# Patient Record
Sex: FEMALE | Race: WHITE | Employment: OTHER | URBAN - METROPOLITAN AREA
[De-identification: names, ages, dates, MRNs, and addresses within clinical notes are randomized per-mention and may not be internally consistent; named-entity substitution may affect disease eponyms.]

---

## 2017-09-14 ENCOUNTER — HOSPITAL ENCOUNTER (OUTPATIENT)
Dept: MAMMOGRAPHY | Age: 47
Discharge: HOME | End: 2017-09-14
Attending: OBSTETRICS & GYNECOLOGY
Payer: COMMERCIAL

## 2017-09-14 DIAGNOSIS — Z12.31 VISIT FOR SCREENING MAMMOGRAM: ICD-10-CM

## 2017-09-14 PROCEDURE — 77067 SCR MAMMO BI INCL CAD: CPT

## 2018-08-08 ENCOUNTER — HOSPITAL ENCOUNTER (OUTPATIENT)
Dept: ULTRASOUND IMAGING | Age: 48
Discharge: HOME | End: 2018-08-08
Attending: OTOLARYNGOLOGY
Payer: COMMERCIAL

## 2018-08-08 VITALS
SYSTOLIC BLOOD PRESSURE: 106 MMHG | RESPIRATION RATE: 18 BRPM | OXYGEN SATURATION: 99 % | DIASTOLIC BLOOD PRESSURE: 74 MMHG | HEART RATE: 77 BPM | TEMPERATURE: 98 F

## 2018-08-08 DIAGNOSIS — E04.1 THYROID NODULE: ICD-10-CM

## 2018-08-08 PROCEDURE — 88173 CYTOPATH EVAL FNA REPORT: CPT

## 2018-08-08 PROCEDURE — 74011250636 HC RX REV CODE- 250/636: Performed by: RADIOLOGY

## 2018-08-08 PROCEDURE — 88305 TISSUE EXAM BY PATHOLOGIST: CPT

## 2018-08-08 PROCEDURE — 10022 US GUIDE FINE NDL ASP THYROID: CPT

## 2018-08-08 PROCEDURE — 74011000250 HC RX REV CODE- 250: Performed by: RADIOLOGY

## 2018-08-08 RX ORDER — LIDOCAINE HYDROCHLORIDE 20 MG/ML
20-200 INJECTION, SOLUTION INFILTRATION; PERINEURAL ONCE
Status: COMPLETED | OUTPATIENT
Start: 2018-08-08 | End: 2018-08-08

## 2018-08-08 RX ADMIN — LIDOCAINE HYDROCHLORIDE 120 MG: 20 INJECTION, SOLUTION INFILTRATION; PERINEURAL at 14:48

## 2018-08-08 RX ADMIN — SODIUM BICARBONATE 2 ML: 0.2 INJECTION, SOLUTION INTRAVENOUS at 14:48

## 2018-08-08 NOTE — IP AVS SNAPSHOT
[facility] 
 
 
 [address] 
[phone] Patient: [facility] MRN: [id number] OAO:[id number] About your hospitalization You were admitted on:  August 8, 2018 You last received care in the:  D RADIOLOGY ULTRASOUND You were discharged on:  August 8, 2018 Why you were hospitalized Your primary diagnosis was:  Not on File Follow-up Information None Your Scheduled Appointments Tuesday August 14, 2018  1:45 PM EDT Office Visit with Jad Friedman, [address]  ENT 8001 [facility]) [address]  
[phone] Friday September 21, 2018  2:15 PM EDT  
KIT MAMMO SCREENING with E KIT BI ROOM 2 [facility] ([facility]) [address] [place] [address]  
[phone] ***** NOTE: Appointments for the Mobile Mammography UNIT CANNOT be made on My Chart *****  PATIENT ARRIVAL - Please report 30 minutes early to check in. GENERAL INSTRUCTIONS -  On the day of your exam do not use any bath powder, deodorant or lotions on the chest or armpit area. Wear two-piece outfit for ease of changing. Allow at least 1 hour for test. -  If scheduled at [facility], please register on the 1st floor before going upstairs. [address] [facility]. 2nd floor [facility] Discharge Orders None A check lou indicates which time of day the medication should be taken. My Medications ASK your doctor about these medications Instructions Each Dose to Equal  
 Morning Noon Evening Bedtime FISH OIL 1,000 mg Cap Generic drug:  omega-3 fatty acids-vitamin e Your last dose was: Your next dose is: Take  by mouth daily. FLEXERIL 10 mg tablet Generic drug:  cyclobenzaprine Your last dose was: Your next dose is: Take  by mouth three (3) times daily as needed for Muscle Spasm(s). * fluticasone 50 mcg/actuation nasal spray Commonly known as:  Rey Gut Your last dose was: Your next dose is:    
   
   
 2 sprays per nostril daily * fluticasone 50 mcg/actuation nasal spray Commonly known as:  Rey Gut Your last dose was: Your next dose is:    
   
   
 2 sprays each nostril daily GLUCOSAMINE COMPLEX PO Your last dose was: Your next dose is: Take  by mouth daily. VITAMIN C 1,000 mg tablet Generic drug:  ascorbic acid (vitamin C) Your last dose was: Your next dose is: Take  by mouth daily. WOMEN'S DAILY MULTIVITAMIN PO Your last dose was: Your next dose is: Take  by mouth daily. ZENCHENT FE 0.4mg-35mcg(21) and 75 mg (7) Chew Generic drug:  Noreth-Ethinyl Estradiol-Iron Your last dose was: Your next dose is: Take 1 Tab by mouth daily. Continuous use, active pills only. Needs 4 packs for a 3 month supply 1 Tab * Notice: This list has 2 medication(s) that are the same as other medications prescribed for you. Read the directions carefully, and ask your doctor or other care provider to review them with you. Discharge Instructions [address] Department of Interventional Radiology [address] [facility] 
[phone] Office 
[phone] Fax BIOPSY DISCHARGE INSTRUCTIONS General Instructions:    A biopsy is the removal of a small piece of tissue for microscopic examination or testing. Healthy tissue can be obtained for the purpose of tissue-type matching for transplants. Unhealthy tissues are more commonly biopsied to diagnose disease. General Biopsy: 
   A mass can grow in any area of the body, and we are taking a specimen as ordered by your doctor. The risks are the same. They include bleeding, pain, and infection. Home Care Instructions: You may resume your regular diet and medication regimen. Do not drink alcohol, drive, or make any important legal decisions in the next 24 hours. Do not lift anything heavier than a gallon of milk until the soreness goes away. You may use over the counter acetaminophen or ibuprofen for the soreness. You may apply an ice pack to the affected area for 20-30 minutes at time for the first 24 hours. After that, you may apply a heat pack. Call If: You should call your Physician and/or the Radiology Nurse if you have any questions or concerns about the biopsy site. Call if you should have increased pain, fever, redness, drainage, or bleeding more than a small spot on the bandage. Follow-Up Instructions: Please see your ordering doctor as he/she has requested. To Reach Us:   
 
 
Patient Signature: 
Date: 8/8/2018 Discharging Nurse: Sandra Bowen RN Introducing [facility] SERVICES! Dear Marilynn Cabrera: Thank you for requesting a [employer] account. Our records indicate that you already have an active [employer] account. You can access your account anytime at [email or web address] Did you know that you can access your hospital and ER discharge instructions at any time in [employer]? You can also review all of your test results from your hospital stay or ER visit. Additional Information If you have questions, please visit the Frequently Asked Questions section of the [employer] website at [email or web address] Remember, [employer] is NOT to be used for urgent needs.  For medical emergencies, dial 911. Now available from your iPhone and Android! Introducing Domenico Walton As a [facility] patient, I wanted to make you aware of our electronic visit tool called Domenico Walton. [employer] allows you to connect within minutes with a medical provider 24 hours a day, seven days a week via a mobile device or tablet or logging into a secure website from your computer. You can access Domenico Walton from anywhere in the [place]. A virtual visit might be right for you when you have a simple condition and feel like you just dont want to get out of bed, or cant get away from work for an appointment, when your regular [facility] provider is not available (evenings, weekends or holidays), or when youre out of town and need minor care. Electronic visits cost only $49 and if the [employer] provider determines a prescription is needed to treat your condition, one can be electronically transmitted to a nearby pharmacy*. Please take a moment to enroll today if you have not already done so. The enrollment process is free and takes just a few minutes. To enroll, please download the [employer] rand to your tablet or phone, or visit [email or web address] to enroll on your computer. And, as an [address] [place] [address] patient with a [employer] account, the results of your visits will be scanned into your electronic medical record and your primary care provider will be able to view the scanned results. We urge you to continue to see your regular [facility] provider for your ongoing medical care. And while your primary care provider may not be the one available when you seek a Domenico Walton virtual visit, the peace of mind you get from getting a real diagnosis real time can be priceless. For more information on Domenico Walton, view our Frequently Asked Questions (FAQs) at [email or web address].  
 
Sincerely, 
 
Odette Sales MD 
 Chief Medical Officer [employer] *:  certain medications cannot be prescribed via Domenico Walton Unresulted Labs-Please follow up with your PCP about these lab tests Order Current Status US GUIDE FINE NDL ASP THYROID In process Providers Seen During Your Hospitalization Provider Specialty Primary office phone Rolan Stover DO Otolaryngology [phone] Your Primary Care Physician (PCP) Primary Care Physician Office Phone Office Fax Oscar Del Rio [phone] You are allergic to the following Allergen Reactions Adhesive Other (comments) Skin irritation; burning with tape Recent Documentation OB Status Smoking Status Hysterectomy Never Smoker Emergency Contacts Name Discharge Info Relation Home Work Mobile DayanaPer DISCHARGE CAREGIVER [3] Spouse [3] [phone] Broderick Anne  Son [22] [phone] Patient Belongings The following personal items are in your possession at time of discharge: 
                             
 
  
  
 Please provide this summary of care documentation to your next provider. Signatures-by signing, you are acknowledging that this After Visit Summary has been reviewed with you and you have received a copy. Patient Signature:  ____________________________________________________________ Date:  ____________________________________________________________  
  
Alisia Correa Provider Signature:  ____________________________________________________________ Date:  ____________________________________________________________

## 2018-08-08 NOTE — PROGRESS NOTES
Recovery period without difficulty. Pt alert and oriented and denies pain. Dressing is clean, dry, and intact. Reviewed discharge instructions with patient verbalized understanding. Pt escorted to lobby discharge area  Vital signs  completed.

## 2018-08-08 NOTE — DISCHARGE INSTRUCTIONS
[address]  Department of Interventional Radiology  [facility]  [phone] Office  [phone] Fax    BIOPSY DISCHARGE INSTRUCTIONS    General Instructions:     A biopsy is the removal of a small piece of tissue for microscopic examination or testing. Healthy tissue can be obtained for the purpose of tissue-type matching for transplants. Unhealthy tissues are more commonly biopsied to diagnose disease. General Biopsy:     A mass can grow in any area of the body, and we are taking a specimen as ordered by your doctor. The risks are the same. They include bleeding, pain, and infection. Home Care Instructions: You may resume your regular diet and medication regimen. Do not drink alcohol, drive, or make any important legal decisions in the next 24 hours. Do not lift anything heavier than a gallon of milk until the soreness goes away. You may use over the counter acetaminophen or ibuprofen for the soreness. You may apply an ice pack to the affected area for 20-30 minutes at time for the first 24 hours. After that, you may apply a heat pack. Call If: You should call your Physician and/or the Radiology Nurse if you have any questions or concerns about the biopsy site. Call if you should have increased pain, fever, redness, drainage, or bleeding more than a small spot on the bandage. Follow-Up Instructions: Please see your ordering doctor as he/she has requested.     To Reach Us:        Patient Signature:  Date: 8/8/2018  Discharging Nurse: Sandra Bowen RN

## 2018-09-21 ENCOUNTER — HOSPITAL ENCOUNTER (OUTPATIENT)
Dept: MAMMOGRAPHY | Age: 48
Discharge: HOME | End: 2018-09-21
Attending: OBSTETRICS & GYNECOLOGY
Payer: COMMERCIAL

## 2018-09-21 DIAGNOSIS — Z12.31 ENCOUNTER FOR SCREENING MAMMOGRAM FOR MALIGNANT NEOPLASM OF BREAST: ICD-10-CM

## 2018-09-21 PROCEDURE — 77067 SCR MAMMO BI INCL CAD: CPT

## 2019-07-09 PROBLEM — R10.9 ABDOMINAL PAIN OF OTHER SPECIFIED SITE: Status: ACTIVE | Noted: 2019-07-09

## 2019-09-26 ENCOUNTER — HOSPITAL ENCOUNTER (OUTPATIENT)
Dept: MAMMOGRAPHY | Age: 49
Discharge: HOME | End: 2019-09-26
Attending: OBSTETRICS & GYNECOLOGY
Payer: COMMERCIAL

## 2019-09-26 DIAGNOSIS — Z12.31 ENCOUNTER FOR SCREENING MAMMOGRAM FOR MALIGNANT NEOPLASM OF BREAST: ICD-10-CM

## 2019-09-26 PROCEDURE — 77067 SCR MAMMO BI INCL CAD: CPT

## 2020-07-06 DIAGNOSIS — J34.89 NASAL OBSTRUCTION: ICD-10-CM

## 2020-07-06 DIAGNOSIS — J34.2 DEVIATED NASAL SEPTUM: Primary | ICD-10-CM

## 2020-07-06 DIAGNOSIS — J34.3 HYPERTROPHY OF NASAL TURBINATES: ICD-10-CM

## 2020-07-06 DIAGNOSIS — J32.9 CHRONIC SINUSITIS, UNSPECIFIED LOCATION: ICD-10-CM

## 2020-07-13 ENCOUNTER — HOSPITAL ENCOUNTER (OUTPATIENT)
Dept: SURGERY | Age: 50
Discharge: HOME | End: 2020-07-13

## 2020-07-15 VITALS — BODY MASS INDEX: 22.32 KG/M2 | WEIGHT: 126 LBS | HEIGHT: 63 IN

## 2020-07-20 ENCOUNTER — ANESTHESIA EVENT (OUTPATIENT)
Dept: SURGERY | Age: 50
End: 2020-07-20
Payer: COMMERCIAL

## 2020-07-20 ENCOUNTER — HOSPITAL ENCOUNTER (OUTPATIENT)
Age: 50
Setting detail: OUTPATIENT SURGERY
Discharge: HOME | End: 2020-07-20
Attending: OTOLARYNGOLOGY | Admitting: OTOLARYNGOLOGY
Payer: COMMERCIAL

## 2020-07-20 ENCOUNTER — ANESTHESIA (OUTPATIENT)
Dept: SURGERY | Age: 50
End: 2020-07-20
Payer: COMMERCIAL

## 2020-07-20 VITALS
RESPIRATION RATE: 15 BRPM | OXYGEN SATURATION: 94 % | HEART RATE: 58 BPM | TEMPERATURE: 98.1 F | DIASTOLIC BLOOD PRESSURE: 61 MMHG | WEIGHT: 125.7 LBS | SYSTOLIC BLOOD PRESSURE: 109 MMHG | BODY MASS INDEX: 22.27 KG/M2

## 2020-07-20 DIAGNOSIS — J32.9 CHRONIC SINUSITIS, UNSPECIFIED LOCATION: ICD-10-CM

## 2020-07-20 DIAGNOSIS — J34.89 NASAL OBSTRUCTION: ICD-10-CM

## 2020-07-20 DIAGNOSIS — J34.2 DEVIATED NASAL SEPTUM: ICD-10-CM

## 2020-07-20 DIAGNOSIS — J34.3 HYPERTROPHY OF NASAL TURBINATES: ICD-10-CM

## 2020-07-20 LAB — HGB BLD-MCNC: 13.9 G/DL (ref 11.7–15.4)

## 2020-07-20 PROCEDURE — 77030036727 HC DEV INFL BLN SNUS SE DISP ACCL -B: Performed by: OTOLARYNGOLOGY

## 2020-07-20 PROCEDURE — 76210000006 HC OR PH I REC 0.5 TO 1 HR: Performed by: OTOLARYNGOLOGY

## 2020-07-20 PROCEDURE — 74011250636 HC RX REV CODE- 250/636: Performed by: ANESTHESIOLOGY

## 2020-07-20 PROCEDURE — C1887 CATHETER, GUIDING: HCPCS | Performed by: OTOLARYNGOLOGY

## 2020-07-20 PROCEDURE — 74011000250 HC RX REV CODE- 250: Performed by: OTOLARYNGOLOGY

## 2020-07-20 PROCEDURE — 77030018390 HC SPNG HEMSTAT2 J&J -B: Performed by: OTOLARYNGOLOGY

## 2020-07-20 PROCEDURE — 77030018836 HC SOL IRR NACL ICUM -A: Performed by: OTOLARYNGOLOGY

## 2020-07-20 PROCEDURE — 77030037088 HC TUBE ENDOTRACH ORAL NSL COVD-A: Performed by: ANESTHESIOLOGY

## 2020-07-20 PROCEDURE — 74011250636 HC RX REV CODE- 250/636: Performed by: NURSE ANESTHETIST, CERTIFIED REGISTERED

## 2020-07-20 PROCEDURE — 76060000033 HC ANESTHESIA 1 TO 1.5 HR: Performed by: OTOLARYNGOLOGY

## 2020-07-20 PROCEDURE — 77030002888 HC SUT CHRMC J&J -A: Performed by: OTOLARYNGOLOGY

## 2020-07-20 PROCEDURE — 85018 HEMOGLOBIN: CPT

## 2020-07-20 PROCEDURE — 74011250637 HC RX REV CODE- 250/637: Performed by: OTOLARYNGOLOGY

## 2020-07-20 PROCEDURE — 74011000250 HC RX REV CODE- 250: Performed by: NURSE ANESTHETIST, CERTIFIED REGISTERED

## 2020-07-20 PROCEDURE — 76010000149 HC OR TIME 1 TO 1.5 HR: Performed by: OTOLARYNGOLOGY

## 2020-07-20 PROCEDURE — 77030008357 HC SPLNT NSL INT THOM -B: Performed by: OTOLARYNGOLOGY

## 2020-07-20 PROCEDURE — 77030006907 HC BLD SNUS SHV MEDT -C: Performed by: OTOLARYNGOLOGY

## 2020-07-20 PROCEDURE — 77030039425 HC BLD LARYNG TRULITE DISP TELE -A: Performed by: ANESTHESIOLOGY

## 2020-07-20 PROCEDURE — 76210000020 HC REC RM PH II FIRST 0.5 HR: Performed by: OTOLARYNGOLOGY

## 2020-07-20 PROCEDURE — 77030002916 HC SUT ETHLN J&J -A: Performed by: OTOLARYNGOLOGY

## 2020-07-20 RX ORDER — LIDOCAINE HYDROCHLORIDE 10 MG/ML
0.1 INJECTION INFILTRATION; PERINEURAL AS NEEDED
Status: DISCONTINUED | OUTPATIENT
Start: 2020-07-20 | End: 2020-07-20 | Stop reason: HOSPADM

## 2020-07-20 RX ORDER — DEXAMETHASONE SODIUM PHOSPHATE 4 MG/ML
INJECTION, SOLUTION INTRA-ARTICULAR; INTRALESIONAL; INTRAMUSCULAR; INTRAVENOUS; SOFT TISSUE AS NEEDED
Status: DISCONTINUED | OUTPATIENT
Start: 2020-07-20 | End: 2020-07-20 | Stop reason: HOSPADM

## 2020-07-20 RX ORDER — MIDAZOLAM HYDROCHLORIDE 1 MG/ML
2 INJECTION, SOLUTION INTRAMUSCULAR; INTRAVENOUS
Status: COMPLETED | OUTPATIENT
Start: 2020-07-20 | End: 2020-07-20

## 2020-07-20 RX ORDER — GLYCOPYRROLATE 0.2 MG/ML
INJECTION INTRAMUSCULAR; INTRAVENOUS AS NEEDED
Status: DISCONTINUED | OUTPATIENT
Start: 2020-07-20 | End: 2020-07-20 | Stop reason: HOSPADM

## 2020-07-20 RX ORDER — FENTANYL CITRATE 50 UG/ML
INJECTION, SOLUTION INTRAMUSCULAR; INTRAVENOUS AS NEEDED
Status: DISCONTINUED | OUTPATIENT
Start: 2020-07-20 | End: 2020-07-20 | Stop reason: HOSPADM

## 2020-07-20 RX ORDER — DIPHENHYDRAMINE HYDROCHLORIDE 50 MG/ML
12.5 INJECTION, SOLUTION INTRAMUSCULAR; INTRAVENOUS
Status: DISCONTINUED | OUTPATIENT
Start: 2020-07-20 | End: 2020-07-21 | Stop reason: HOSPADM

## 2020-07-20 RX ORDER — LIDOCAINE HYDROCHLORIDE 20 MG/ML
INJECTION, SOLUTION EPIDURAL; INFILTRATION; INTRACAUDAL; PERINEURAL AS NEEDED
Status: DISCONTINUED | OUTPATIENT
Start: 2020-07-20 | End: 2020-07-20 | Stop reason: HOSPADM

## 2020-07-20 RX ORDER — HYDROMORPHONE HYDROCHLORIDE 2 MG/ML
0.5 INJECTION, SOLUTION INTRAMUSCULAR; INTRAVENOUS; SUBCUTANEOUS
Status: COMPLETED | OUTPATIENT
Start: 2020-07-20 | End: 2020-07-20

## 2020-07-20 RX ORDER — ROCURONIUM BROMIDE 10 MG/ML
INJECTION, SOLUTION INTRAVENOUS AS NEEDED
Status: DISCONTINUED | OUTPATIENT
Start: 2020-07-20 | End: 2020-07-20 | Stop reason: HOSPADM

## 2020-07-20 RX ORDER — SODIUM CHLORIDE, SODIUM LACTATE, POTASSIUM CHLORIDE, CALCIUM CHLORIDE 600; 310; 30; 20 MG/100ML; MG/100ML; MG/100ML; MG/100ML
75 INJECTION, SOLUTION INTRAVENOUS CONTINUOUS
Status: DISCONTINUED | OUTPATIENT
Start: 2020-07-20 | End: 2020-07-21 | Stop reason: HOSPADM

## 2020-07-20 RX ORDER — SODIUM CHLORIDE, SODIUM LACTATE, POTASSIUM CHLORIDE, CALCIUM CHLORIDE 600; 310; 30; 20 MG/100ML; MG/100ML; MG/100ML; MG/100ML
100 INJECTION, SOLUTION INTRAVENOUS CONTINUOUS
Status: DISCONTINUED | OUTPATIENT
Start: 2020-07-20 | End: 2020-07-20 | Stop reason: HOSPADM

## 2020-07-20 RX ORDER — FLUMAZENIL 0.1 MG/ML
0.2 INJECTION INTRAVENOUS
Status: DISCONTINUED | OUTPATIENT
Start: 2020-07-20 | End: 2020-07-21 | Stop reason: HOSPADM

## 2020-07-20 RX ORDER — NEOSTIGMINE METHYLSULFATE 1 MG/ML
INJECTION, SOLUTION INTRAVENOUS AS NEEDED
Status: DISCONTINUED | OUTPATIENT
Start: 2020-07-20 | End: 2020-07-20 | Stop reason: HOSPADM

## 2020-07-20 RX ORDER — NALOXONE HYDROCHLORIDE 0.4 MG/ML
0.1 INJECTION, SOLUTION INTRAMUSCULAR; INTRAVENOUS; SUBCUTANEOUS AS NEEDED
Status: DISCONTINUED | OUTPATIENT
Start: 2020-07-20 | End: 2020-07-21 | Stop reason: HOSPADM

## 2020-07-20 RX ORDER — EPHEDRINE SULFATE/0.9% NACL/PF 50 MG/5 ML
SYRINGE (ML) INTRAVENOUS AS NEEDED
Status: DISCONTINUED | OUTPATIENT
Start: 2020-07-20 | End: 2020-07-20 | Stop reason: HOSPADM

## 2020-07-20 RX ORDER — MUPIROCIN 20 MG/G
OINTMENT TOPICAL AS NEEDED
Status: DISCONTINUED | OUTPATIENT
Start: 2020-07-20 | End: 2020-07-20 | Stop reason: HOSPADM

## 2020-07-20 RX ORDER — OXYCODONE HYDROCHLORIDE 5 MG/1
5 TABLET ORAL
Status: DISCONTINUED | OUTPATIENT
Start: 2020-07-20 | End: 2020-07-21 | Stop reason: HOSPADM

## 2020-07-20 RX ORDER — ONDANSETRON 2 MG/ML
INJECTION INTRAMUSCULAR; INTRAVENOUS AS NEEDED
Status: DISCONTINUED | OUTPATIENT
Start: 2020-07-20 | End: 2020-07-20 | Stop reason: HOSPADM

## 2020-07-20 RX ORDER — OXYMETAZOLINE HCL 0.05 %
SPRAY, NON-AEROSOL (ML) NASAL AS NEEDED
Status: DISCONTINUED | OUTPATIENT
Start: 2020-07-20 | End: 2020-07-20 | Stop reason: HOSPADM

## 2020-07-20 RX ORDER — LIDOCAINE HYDROCHLORIDE AND EPINEPHRINE 10; 10 MG/ML; UG/ML
INJECTION, SOLUTION INFILTRATION; PERINEURAL AS NEEDED
Status: DISCONTINUED | OUTPATIENT
Start: 2020-07-20 | End: 2020-07-20 | Stop reason: HOSPADM

## 2020-07-20 RX ADMIN — HYDROMORPHONE HYDROCHLORIDE 0.5 MG: 2 INJECTION INTRAMUSCULAR; INTRAVENOUS; SUBCUTANEOUS at 13:26

## 2020-07-20 RX ADMIN — HYDROMORPHONE HYDROCHLORIDE 0.5 MG: 2 INJECTION INTRAMUSCULAR; INTRAVENOUS; SUBCUTANEOUS at 13:14

## 2020-07-20 RX ADMIN — Medication 10 MG: at 12:07

## 2020-07-20 RX ADMIN — MIDAZOLAM 2 MG: 1 INJECTION INTRAMUSCULAR; INTRAVENOUS at 11:45

## 2020-07-20 RX ADMIN — HYDROMORPHONE HYDROCHLORIDE 0.5 MG: 2 INJECTION INTRAMUSCULAR; INTRAVENOUS; SUBCUTANEOUS at 13:19

## 2020-07-20 RX ADMIN — Medication 3 MG: at 12:53

## 2020-07-20 RX ADMIN — GLYCOPYRROLATE 0.4 MG: 0.2 INJECTION, SOLUTION INTRAMUSCULAR; INTRAVENOUS at 12:53

## 2020-07-20 RX ADMIN — ROCURONIUM BROMIDE 30 MG: 10 INJECTION, SOLUTION INTRAVENOUS at 11:54

## 2020-07-20 RX ADMIN — LIDOCAINE HYDROCHLORIDE 60 MG: 20 INJECTION, SOLUTION EPIDURAL; INFILTRATION; INTRACAUDAL; PERINEURAL at 11:54

## 2020-07-20 RX ADMIN — FENTANYL CITRATE 100 MCG: 50 INJECTION INTRAMUSCULAR; INTRAVENOUS at 11:54

## 2020-07-20 RX ADMIN — ONDANSETRON 4 MG: 2 INJECTION INTRAMUSCULAR; INTRAVENOUS at 12:12

## 2020-07-20 RX ADMIN — PHENYLEPHRINE HYDROCHLORIDE 50 MCG: 10 INJECTION INTRAVENOUS at 12:34

## 2020-07-20 RX ADMIN — SODIUM CHLORIDE, SODIUM LACTATE, POTASSIUM CHLORIDE, AND CALCIUM CHLORIDE 100 ML/HR: 600; 310; 30; 20 INJECTION, SOLUTION INTRAVENOUS at 11:45

## 2020-07-20 RX ADMIN — PHENYLEPHRINE HYDROCHLORIDE 50 MCG: 10 INJECTION INTRAVENOUS at 12:05

## 2020-07-20 RX ADMIN — HYDROMORPHONE HYDROCHLORIDE 0.5 MG: 2 INJECTION INTRAMUSCULAR; INTRAVENOUS; SUBCUTANEOUS at 13:31

## 2020-07-20 RX ADMIN — PHENYLEPHRINE HYDROCHLORIDE 50 MCG: 10 INJECTION INTRAVENOUS at 12:19

## 2020-07-20 RX ADMIN — DEXAMETHASONE SODIUM PHOSPHATE 5 MG: 4 INJECTION, SOLUTION INTRAMUSCULAR; INTRAVENOUS at 12:12

## 2020-07-20 NOTE — ANESTHESIA POSTPROCEDURE EVALUATION
Procedure(s):  SEPTOPLASTY  TURBINATE REDUCTION  EXCISION OF RIGHT JOYCE BULLOSA/ BILATERAL MAXILARY BALLOON SINUPLASTY WITH POSS BIOPSIES.     general    Anesthesia Post Evaluation        Patient location during evaluation: PACU  Patient participation: complete - patient participated  Level of consciousness: responsive to verbal stimuli  Pain management: adequate  Airway patency: patent  Anesthetic complications: no  Cardiovascular status: acceptable  Respiratory status: acceptable  Hydration status: euvolemic        INITIAL Post-op Vital signs:   Vitals Value Taken Time   /62 7/20/2020  1:57 PM   Temp 36.7 °C (98.1 °F) 7/20/2020  1:07 PM   Pulse 56 7/20/2020  1:57 PM   Resp 16 7/20/2020  1:57 PM   SpO2 93 % 7/20/2020  1:57 PM

## 2020-07-20 NOTE — DISCHARGE INSTRUCTIONS
After Septoplasty  AMBULATORY CARE:  Seek care immediately if:     You have trouble breathing. Clear fluid comes out of your nose when you bend your head forward. Your heart is beating fast or has an irregular rhythm. Your nose or the roof of your mouth is pale or starting to turn black. You have severe pain. You have red streaks on the skin around your nose. Contact your healthcare provider if:     Your symptoms do not improve within 1 week. Your nose bleeds more than you were told to expect. You have a fever. Your nose is red, swollen, and draining pus. Your upper teeth, gum, or nose is numb. Your sense of smell or taste is different than before surgery. You have a change in your vision. You have questions or concerns about your condition or care. Medicines:   Medicines may be given to help decrease pain and prevent infection. You may also need nose sprays to keep your nose moist and decrease swelling and congestion. Take your medicine as directed. Contact your healthcare provider if you think your medicine is not helping or if you have side effects. Tell him or her if you are allergic to any medicine. Keep a list of the medicines, vitamins, and herbs you take. Include the amounts, and when and why you take them. Bring the list or the pill bottles to follow-up visits. Carry your medicine list with you in case of an emergency. Do not blow your nose: The increase in pressure can cause bruising, swelling, and bleeding. Try not to sneeze. If you have to sneeze, keep your mouth open to decrease pressure in your nose. Apply ice: Apply ice on your nose for 15 to 20 minutes every hour for the first day. Use an ice pack, or put crushed ice in a plastic bag. Cover it with a towel. Ice helps prevent tissue damage and decreases swelling and pain. Elevate your head and upper back: Keep your head and upper back elevated when you rest, such as in a recliner.  Place extra pillows under your head and neck when you sleep in bed. Elevation will help decrease swelling. Self-care:   Use a cool mist humidifier. A cool mist humidifier will increase air moisture in your home. This will help keep your nose and throat moist and prevent irritation. Limit activity for 3 days or as directed. Do not lift objects over 20 pounds. Ask when you can return to your usual daily activities. Care for your wound as directed. You may be able to use saltwater or hydrogen peroxide to remove crusts. If you have a splint, do not get it wet or try to remove it. Do not smoke for at least 2 days after your surgery. Smoke can irritate your nose and delay healing. If you smoke, it is never too late to quit. Ask your healthcare provider for information if you need help quitting. Follow up with your healthcare provider as directed: You may need to return to have your gauze or splint removed. Write down your questions so you remember to ask them during your visits.

## 2020-07-20 NOTE — ANESTHESIA PREPROCEDURE EVALUATION
Relevant Problems   No relevant active problems       Anesthetic History   No history of anesthetic complications            Review of Systems / Medical History  Patient summary reviewed and pertinent labs reviewed    Pulmonary                Comments: Chronic sinus drainage and hoarseness   Neuro/Psych   Within defined limits           Cardiovascular  Within defined limits                Exercise tolerance: >4 METS     GI/Hepatic/Renal  Within defined limits              Endo/Other        Arthritis     Other Findings   Comments: PCOS           Physical Exam    Airway  Mallampati: II  TM Distance: 4 - 6 cm  Neck ROM: normal range of motion   Mouth opening: Normal     Cardiovascular  Regular rate and rhythm,  S1 and S2 normal,  no murmur, click, rub, or gallop             Dental  No notable dental hx       Pulmonary  Breath sounds clear to auscultation               Abdominal  GI exam deferred       Other Findings            Anesthetic Plan    ASA: 2  Anesthesia type: general  ETT        Induction: Intravenous  Anesthetic plan and risks discussed with: Patient

## 2020-07-21 NOTE — OP NOTES
[address] [address]  OPERATIVE REPORT    Name:  Maria Guadalupe Fitzpatrick  MR#:  [id number]  :  1970  ACCOUNT #:  [de-identified]  DATE OF SERVICE:  2020    PREOPERATIVE DIAGNOSES:  Chronic sinusitis, deviated nasal septum, inferior turbinate hypertrophy, nasal obstruction. POSTOPERATIVE DIAGNOSES:  Chronic sinusitis, deviated nasal septum, inferior turbinate hypertrophy, nasal obstruction. PROCEDURES PERFORMED:  Septoplasty, bilateral submucosal resection of the inferior turbinates, bilateral maxillary balloon sinuplasty with removal of contents, right frontal balloon sinuplasty with removal of contents, and excision of right riley bullosa. SURGEON:  Pearl Luke. DO Jovani    ASSISTANT:  None. ANESTHESIA:  General.    COMPLICATIONS:  None. SPECIMENS REMOVED:  None. IMPLANTS:  Bilateral Delgadillo splints. ESTIMATED BLOOD LOSS:  60 mL. HISTORY:  A 59-year-old female. She came to see me in the office. Initially, she came to see me about thyroid issues, but through seeing her for that, she had also expressed issues with postnasal drip, clearing of the throat, coughing, poor nasal breathing, sinus issues, facial pressure, so I did end up checking a CT scan of the sinuses, which revealed there to be mucosal thickening and blockage of the maxillary sinus bilaterally. She had also some blockage and narrowing of the nasofrontal ducts bilaterally and she was getting pressure in the frontal, ethmoid, and maxillary sinuses, so I did try nasal steroid sprays and antihistamines. She had been on steroids themselves. We have tried a variety of medical therapy, which was not very successful at giving her really any relief in terms of the facial pressure or sinus symptoms. So, she also had a deviated nasal septum bilaterally. This was worse on the left. She had a posterior septal spur bilaterally. She had inferior turbinate hypertrophy bilaterally and she had a riley bullosa also on her scan.   So, based on the history and physical exam, it was my recommendation she undergo a septoplasty, excision of the right riley bullosa, bilateral maxillary and bilateral frontal balloon sinuplasty with possible biopsy and septoplasty and the procedure risks and benefits were discussed with her in the office. All questions were answered and she is agreeable to the surgery. SURGERY DETAILS:  The patient was identified in the preoperative waiting area. She was taken back to the operating room where she underwent general anesthesia. Approximately 5 mL of 1% lidocaine with epinephrine were injected into the inferior turbinates, middle turbinates, and septum bilaterally. Afrin-soaked pledgets were placed in each side of the nose and left there for a few minutes. These were then removed. A hemitransfixion incision was made in the right anterior septum. A left-sided submucoperichondrial and submucoperiosteal flap were then raised. I came back to the bony cartilaginous junction, broke through that, and raised a right-sided submucoperiosteal flap. There was a deviation of the septal cartilage along the floor and she had more of a general deflection of the cartilage to the right. She had prominent maxillary crest going towards the left and she had a right and left posterior septal spur. So, using a combination of Vigo elevator, Cole forceps, and open Diego-Jordan, I was able to isolate and remove the deviated portions of the nasal septum. A 4-0 chromic was used to reapproximate the septal flaps back in the midline position and this was also used to close the hemitransfixion incision. A knife was used to make a small stab incision in the anterior portion of the inferior turbinates.   Cheryl Breonna was used to create a small pocket between the bone of the inferior turbinate and the mucosa medially and then a microdebrider with a turbinate blade was used to reduce the prominent bone and tissue of the inferior turbinates bilaterally. Boies elevator was used to medialize and lateralize the inferior turbinates giving the patient a widely patent nasal airway. I used a Beverly to medialize the middle turbinates bilaterally and this collapsed the riley bullosa on the right side. A microdebrider with a 4 mm blade was then used to shave and remove the lateral portion of the right middle turbinate opening and removing the riley bullosa. Maxillary sinuses were done next, took the introducer starting on the left with the same procedure on the right. Now I was able to feed the guidewire and the balloons into the maxillary sinuses. The balloons were inflated to a pressure of 12, deflated. The sinuses were irrigated using 120 mL of saline each. There was a lot of thick mucus that was rinsed out of each sinus. Reinspection revealed thickened mucosa just inside the sinus openings more posteriorly which was removed and the sinuses appeared to be open and clear. The uncinate process was really lateralized on the right side most likely secondary to the riley bullosa. Frontal sinuses were done next starting on the right. I was able to feed the guidewire and balloon into the right frontal sinus. The balloons were inflated to a pressure of 12, deflated. The sinuses were irrigated using 120 mL of saline and I was able to rinse thick mucus out of sinus, but no sign of any purulence or any obvious sign of infection or fungal infection. Reinspection revealed thickened mucosa within the nasofrontal duct which was removed and the sinus appeared to be open and clear. Working on the left frontal sinus, multiple attempts were done, I could get a broad transillumination of the left frontal sinus, but I was not able to get a real focused area suggesting I was not able to get the guidewire in the whole way. The nasofrontal duct did appear to be very tight on the left side on the scan.   So, again, despite multiple attempts, I was not able to thread the guidewire through, so I was not able to insert the balloon, so the left frontal balloon sinuplasty portion of the procedure was aborted. Some blood was suctioned from the nose and nasopharynx. Delgadillo splints with antibiotic ointment on them were placed in each side of the nose and sewn in place anteriorly using a single nylon stitch and then the patient was awakened and taken to the postop recovery room in a stable condition.       Carlos Anderson DO      [id number]  D:  07/20/2020 13:13  T:  07/21/2020 0:43  JOB #:  [id number]

## 2020-10-09 ENCOUNTER — HOSPITAL ENCOUNTER (OUTPATIENT)
Dept: MAMMOGRAPHY | Age: 50
Discharge: HOME | End: 2020-10-09
Attending: OBSTETRICS & GYNECOLOGY

## 2020-10-09 DIAGNOSIS — Z12.31 VISIT FOR SCREENING MAMMOGRAM: ICD-10-CM

## 2020-10-09 PROCEDURE — 77067 SCR MAMMO BI INCL CAD: CPT

## 2020-10-21 ENCOUNTER — HOSPITAL ENCOUNTER (OUTPATIENT)
Dept: MAMMOGRAPHY | Age: 50
Discharge: HOME | End: 2020-10-21
Attending: OBSTETRICS & GYNECOLOGY
Payer: COMMERCIAL

## 2020-10-21 DIAGNOSIS — R92.8 ABNORMAL FINDING ON BREAST IMAGING: ICD-10-CM

## 2020-10-21 PROCEDURE — 76642 ULTRASOUND BREAST LIMITED: CPT

## 2020-10-21 PROCEDURE — 77065 DX MAMMO INCL CAD UNI: CPT

## 2021-08-24 ENCOUNTER — TRANSCRIBE ORDER (OUTPATIENT)
Dept: SCHEDULING | Age: 51
End: 2021-08-24

## 2021-08-24 DIAGNOSIS — Z12.31 SCREENING MAMMOGRAM FOR HIGH-RISK PATIENT: Primary | ICD-10-CM

## 2021-10-11 ENCOUNTER — HOSPITAL ENCOUNTER (OUTPATIENT)
Dept: MAMMOGRAPHY | Age: 51
Discharge: HOME | End: 2021-10-11
Attending: OBSTETRICS & GYNECOLOGY

## 2021-10-11 DIAGNOSIS — Z12.31 SCREENING MAMMOGRAM FOR HIGH-RISK PATIENT: ICD-10-CM

## 2021-10-11 PROCEDURE — 77067 SCR MAMMO BI INCL CAD: CPT

## 2022-10-13 ENCOUNTER — HOSPITAL ENCOUNTER (OUTPATIENT)
Dept: MAMMOGRAPHY | Age: 52
Discharge: HOME | End: 2022-10-16

## 2022-10-13 DIAGNOSIS — Z12.31 ENCOUNTER FOR SCREENING MAMMOGRAM FOR MALIGNANT NEOPLASM OF BREAST: ICD-10-CM

## 2022-10-13 PROCEDURE — 77067 SCR MAMMO BI INCL CAD: CPT

## 2023-09-18 ENCOUNTER — TRANSCRIBE ORDERS (OUTPATIENT)
Dept: SCHEDULING | Age: 53
End: 2023-09-18

## 2023-09-18 DIAGNOSIS — Z12.31 SCREENING MAMMOGRAM FOR HIGH-RISK PATIENT: Primary | ICD-10-CM

## 2023-10-18 ENCOUNTER — HOSPITAL ENCOUNTER (OUTPATIENT)
Dept: MAMMOGRAPHY | Age: 53
Discharge: HOME | End: 2023-10-21
Attending: OBSTETRICS & GYNECOLOGY

## 2023-10-18 VITALS — BODY MASS INDEX: 21.26 KG/M2 | HEIGHT: 63 IN | WEIGHT: 120 LBS

## 2023-10-18 DIAGNOSIS — Z12.31 SCREENING MAMMOGRAM FOR HIGH-RISK PATIENT: ICD-10-CM

## 2023-10-18 PROCEDURE — 77067 SCR MAMMO BI INCL CAD: CPT

## 2025-01-15 ENCOUNTER — HOSPITAL ENCOUNTER (OUTPATIENT)
Dept: MAMMOGRAPHY | Age: 55
Discharge: HOME | End: 2025-01-18
Attending: OBSTETRICS & GYNECOLOGY

## 2025-01-15 VITALS — BODY MASS INDEX: 21.26 KG/M2 | WEIGHT: 120 LBS | HEIGHT: 63 IN

## 2025-01-15 DIAGNOSIS — Z12.31 VISIT FOR SCREENING MAMMOGRAM: ICD-10-CM

## 2025-01-15 PROCEDURE — 77063 BREAST TOMOSYNTHESIS BI: CPT

## (undated) DEVICE — BLADE 1882040 5PK INFERIOR TURB 2MM

## (undated) DEVICE — KIT PROCEDURE SURG HEAD AND NECK TOTE

## (undated) DEVICE — REM POLYHESIVE ADULT PATIENT RETURN ELECTRODE: Brand: VALLEYLAB

## (undated) DEVICE — DEVICE INFL PRSS G INDIC DISP (MUST BE PURC IN MULTIPLES OF 5)

## (undated) DEVICE — SUT ETHLN 3-0 18IN PS1 BLK --

## (undated) DEVICE — KIT,ANTI FOG,W/SPONGE & FLUID,SOFT PACK: Brand: MEDLINE

## (undated) DEVICE — SYR LR LCK 1ML GRAD NSAF 30ML --

## (undated) DEVICE — SPLINT NSL SEPTAL SUPP REG PRE PUNCHED HOLE SIL STRL BRTH EZ

## (undated) DEVICE — BLADE 1884004HR TRICUT 5PK M4 4MM ROTATE: Brand: TRICUT

## (undated) DEVICE — 2000CC GUARDIAN II: Brand: GUARDIAN

## (undated) DEVICE — TUBING, SUCTION, 1/4" X 10', STRAIGHT: Brand: MEDLINE

## (undated) DEVICE — CODMAN® SURGICAL PATTIES 1" X 3" (2.54CM X 7.62CM): Brand: CODMAN®

## (undated) DEVICE — SUT CHRMC 4-0 27IN RB1 BRN --

## (undated) DEVICE — SOLUTION IV 1000ML 0.9% SOD CHL

## (undated) DEVICE — AGENT HEMSTAT W1XL2IN ABSRB SFT LTWT LAYERED ORC FIBRILLAR

## (undated) DEVICE — SPONGE,NEURO,1"X3",XR,STRL,LF,10/PK: Brand: MEDLINE

## (undated) DEVICE — BALLOON SINUPLASTY 6X16 MM SYS RELIEVA SPINPLUS